# Patient Record
Sex: MALE | Race: BLACK OR AFRICAN AMERICAN | NOT HISPANIC OR LATINO | Employment: OTHER | URBAN - METROPOLITAN AREA
[De-identification: names, ages, dates, MRNs, and addresses within clinical notes are randomized per-mention and may not be internally consistent; named-entity substitution may affect disease eponyms.]

---

## 2021-01-12 ENCOUNTER — TREATMENT (OUTPATIENT)
Dept: PHYSICAL THERAPY | Facility: CLINIC | Age: 71
End: 2021-01-12

## 2021-01-12 ENCOUNTER — TRANSCRIBE ORDERS (OUTPATIENT)
Dept: PHYSICAL THERAPY | Facility: CLINIC | Age: 71
End: 2021-01-12

## 2021-01-12 DIAGNOSIS — M54.50 LOW BACK PAIN, UNSPECIFIED BACK PAIN LATERALITY, UNSPECIFIED CHRONICITY, UNSPECIFIED WHETHER SCIATICA PRESENT: Primary | ICD-10-CM

## 2021-01-12 DIAGNOSIS — M54.40 ACUTE BILATERAL LOW BACK PAIN WITH SCIATICA, SCIATICA LATERALITY UNSPECIFIED: Primary | ICD-10-CM

## 2021-01-12 PROCEDURE — 97140 MANUAL THERAPY 1/> REGIONS: CPT | Performed by: PHYSICAL THERAPIST

## 2021-01-12 PROCEDURE — 97161 PT EVAL LOW COMPLEX 20 MIN: CPT | Performed by: PHYSICAL THERAPIST

## 2021-01-12 PROCEDURE — 97014 ELECTRIC STIMULATION THERAPY: CPT | Performed by: PHYSICAL THERAPIST

## 2021-01-12 NOTE — PROGRESS NOTES
"Physical Therapy Initial Evaluation and Plan of Care    Patient: Estuardo King   : 1950  Diagnosis/ICD-10 Code:  Acute bilateral low back pain with sciatica, sciatica laterality unspecified [M54.40]  Referring practitioner: STIVEN Hodges  Date of Initial Visit: 2021  Today's Date: 2021  Patient seen for 1 sessions           Subjective Questionnaire: Oswestry: 46/50      Subjective Evaluation    History of Present Illness  Mechanism of injury: Patient reports he experienced a sudden onset of L lower back pain when he was at work when doing the push cart. Onset was 22.  He has not been able to work since that time.  He was seen by [facility] today for assessment and subsequent PT referral.  He reports significant pain with diffculty walking due to the pain, loss of balance wo fall and notes that he \"got stuck on the toilet\" due to pain and required assistance to get up.    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 )  Progression: worsening    Social Support  Lives in: one-story house  Lives with: spouse    Hand dominance: right    Treatments  Previous treatment: medication (script received today. )  Patient Goals  Patient goals for therapy: decreased pain             Objective        Special Questions  Patient is experiencing headaches and nausea/motion sickness.       Postural Observations    Additional Postural Observation Details  Seated posture is guarded.  Movements and transitions from sit <> stand and sit <> supine completed in a slow and guarded posturing with voice and facial grimaces.     Palpation   Left   Hypertonic in the erector spinae.   Tenderness of the erector spinae, iliacus and quadratus lumborum. "     Right   Hypertonic in the erector spinae. Tenderness of the erector spinae and quadratus lumborum.     Neurological Testing     Sensation     Lumbar   Left   Intact: light touch, kinesthesia and proprioception  Diminished: pin prick    Right   Intact: light touch, kinesthesia and proprioception  Diminished: pin prick    Reflexes   Left   Patellar (L4): trace (1+)  Achilles (S1): absent (0)  Babinski sign: negative    Right   Patellar (L4): trace (1+)  Achilles (S1): absent (0)  Babinski sign: negative    Active Range of Motion     Additional Active Range of Motion Details  Trunk AROM: severely limited in all planes with pain equal in flexion and extension.    LSB and bilateral rotation with increased L lumbar pain.     Passive Range of Motion     Additional Passive Range of Motion Details  PIVMs:  Dimished, however, unable to get a true value due to the muscle guarding of patient.     Strength/Myotome Testing     Additional Strength Details  Hip: cabrera flex, abd, and knee ext 4/5 with greater pain  Knee flex ankle: cabrera 5/5 wo pain  Patient unable to lay prone in order to test hip extension.     Tests       Thoracic   Positive slump.     Lumbar     Left   Positive crossed SLR and passive SLR.     Right   Positive passive SLR.     Ambulation     Ambulation: Level Surfaces   Ambulation with assistive device: independent    Observational Gait   Gait: antalgic   Decreased walking speed, stride length, left swing time, right swing time, left step length and right step length.   Left foot contact pattern: foot flat  Right foot contact pattern: foot flat  Left arm swing: decreased  Right arm swing: decreased  Base of support: normal    Quality of Movement During Gait   Trunk  Forward lean.      General Comments     Lumbar Comments  Resting /100    HR = 115  No distress signs noted and OK for therapy as per [facility] provider.   Post treatment:  BP  162/99,   HR = 110         Assessment & Plan      Assessment  Impairments: abnormal or restricted ROM, activity intolerance, impaired physical strength, lacks appropriate home exercise program and pain with function  Assessment details: Estuardo King is a 70 y.o. yr old male referred to PT due to a recent onset of lumbar pain from 2021while at work.  He was seen in the clinic today for the initial evaluation.  The current presentation was limited due to patient pain level and the inability to change/attain positions and complete the tests.  He did present with consistent valid efforts to complete all tasks of him. Estuardo's gait was with use of a cane and was slow,antalagic; he reports no need for AD prior to the injury.   His resting BP and HR elevated;however, clearance to treat was provider by [facility] referring provider.  His vitals did not have a significant change during the session.   He would benefit from outpatient physical therapy  in order to achieve the stated goals and maximal functional capacity.  The functional JESICA score is 46/50.   Barriers to therapy: current mobility   Prognosis: good  Prognosis details: Limitations:  Current elevation in BP, HR ( pnt reports started new meds last week).   Functional Limitations: lifting, uncomfortable because of pain, sitting and standing  Goals  Plan Goals: ST visits     1)  Pain levels 3 - 7/10     2)  Increase trunk ROM by 30%     3)  Patient will tolerate standing up to 15 min intrevals wo greater pain     4)  Complete transitions sit <> stand and sit <> supine with normal speed and wo evidence of pain     5)  Gait into the clinic and around the house wo assistive device and with 60% normal speed. ( 5MWT to be completed)        LTG:  DC     1)  Patient will complete pain free trunk ROM in order to complete dressing, bathing and get in/out of care wo greater pain.      2)  Sleep will not be affected by lumbar pain     3)  Patient will be able to complete household tasks in standing wo pain       4)  Normal gait pattern     5)  Walk distance as prior functional status     6)  MMT 5/5 BLE in order to complete household tasks and return to work.     Plan  Therapy options: will be seen for skilled physical therapy services  Planned modality interventions: cryotherapy, high voltage pulsed current (pain management), traction, thermotherapy (hydrocollator packs) and ultrasound  Planned therapy interventions: abdominal trunk stabilization, body mechanics training, flexibility, home exercise program, manual therapy, neuromuscular re-education, soft tissue mobilization, spinal/joint mobilization, stretching, strengthening, therapeutic activities and postural training  Frequency: 3x week  Treatment plan discussed with: patient  Plan details: 3x/ wk x's 24 visits        Timed:         Manual Therapy:    10     mins  08314;     Therapeutic Exercise:         mins  24371;     Neuromuscular Maris:        mins  25199;    Therapeutic Activity:          mins  68054;     Gait Training:           mins  94698;     Ultrasound:          mins  30541;    Ionto                                   mins   21549  Self Care                       3     mins   41127  Canalith Repos         mins 60589      Un-Timed:  Electrical Stimulation:    15     mins  80135 (MC );  Dry Needling          mins self-pay  Traction          mins 37463  Low Eval     24     Mins  02280  Mod Eval          Mins  38799  High Eval                            Mins  50127  Re-Eval                               mins  24947        Timed Treatment:   13   mins   Total Treatment:     52   mins    PT SIGNATURE: Aleyda Cabrera PT   DATE TREATMENT INITIATED: 1/12/2021    Initial Certification  Certification Period: 4/12/2021  I certify that the therapy services are furnished while this patient is under my care.  The services outlined above are required by this patient, and will be reviewed every 90 days.     PHYSICIAN: Deven Pierce PA      DATE:     Please  sign and return via fax to [phone].. Thank you, [facility].

## 2021-01-14 ENCOUNTER — TREATMENT (OUTPATIENT)
Dept: PHYSICAL THERAPY | Facility: CLINIC | Age: 71
End: 2021-01-14

## 2021-01-14 DIAGNOSIS — M54.40 ACUTE BILATERAL LOW BACK PAIN WITH SCIATICA, SCIATICA LATERALITY UNSPECIFIED: Primary | ICD-10-CM

## 2021-01-14 PROCEDURE — 97140 MANUAL THERAPY 1/> REGIONS: CPT | Performed by: PHYSICAL THERAPIST

## 2021-01-14 PROCEDURE — 97014 ELECTRIC STIMULATION THERAPY: CPT | Performed by: PHYSICAL THERAPIST

## 2021-01-14 PROCEDURE — 97110 THERAPEUTIC EXERCISES: CPT | Performed by: PHYSICAL THERAPIST

## 2021-01-14 NOTE — PROGRESS NOTES
Physical Therapy Daily Progress Note    VISIT#: 2    Ciro King reports his pain hasn't been much better since his eval.  Current Pain Level: 9/10    Objective   Resting /100    HR = 103  See Exercise, Manual, and Modality Logs for complete treatment.     Patient Education: taught and performed how to do a log roll in and out of bed    Assessment/Plan  Patient requires a significant amount of increased time to complete his exercises due to the pain in his LB and groin. He experienced occasional 'catching' in his groin during the HS stretch on his opposite side. Difficulty with transitioning into different positions. Does not care for ice so that was left off during ESTIM.    Progress per Plan    Goals  Plan Goals: ST visits     1)  Pain levels 3 - 7/10     2)  Increase trunk ROM by 30%     3)  Patient will tolerate standing up to 15 min intrevals wo greater pain     4)  Complete transitions sit <> stand and sit <> supine with normal speed and wo evidence of pain     5)  Gait into the clinic and around the house wo assistive device and with 60% normal speed. ( 5MWT to be completed)        LTG:  DC     1)  Patient will complete pain free trunk ROM in order to complete dressing, bathing and get in/out of care wo greater pain.      2)  Sleep will not be affected by lumbar pain     3)  Patient will be able to complete household tasks in standing wo pain      4)  Normal gait pattern     5)  Walk distance as prior functional status     6)  MMT 5/5 BLE in order to complete household tasks and return to work.           Timed:         Manual Therapy:    10     mins  58676;     Therapeutic Exercise:    36     mins  05556;         Un-Timed:  Electrical Stimulation:    15     mins  71306 ( );      Timed Treatment:   46   mins   Total Treatment:     66   mins    Cynthia Guzman PTA    Physical Therapist Assistant

## 2021-01-15 ENCOUNTER — TREATMENT (OUTPATIENT)
Dept: PHYSICAL THERAPY | Facility: CLINIC | Age: 71
End: 2021-01-15

## 2021-01-15 DIAGNOSIS — M54.40 ACUTE BILATERAL LOW BACK PAIN WITH SCIATICA, SCIATICA LATERALITY UNSPECIFIED: Primary | ICD-10-CM

## 2021-01-15 PROCEDURE — 97140 MANUAL THERAPY 1/> REGIONS: CPT | Performed by: PHYSICAL THERAPIST

## 2021-01-15 PROCEDURE — 97014 ELECTRIC STIMULATION THERAPY: CPT | Performed by: PHYSICAL THERAPIST

## 2021-01-15 PROCEDURE — 97110 THERAPEUTIC EXERCISES: CPT | Performed by: PHYSICAL THERAPIST

## 2021-01-15 NOTE — PROGRESS NOTES
Physical Therapy Daily Progress Note    VISIT#: 3    Subjective   Estuardo King reports that he back pain is a little better.  Location mostly on the left lumbar to left hip       Objective   BP seated:  150/84  HR:  67  Gait into the clinic with a slight increased speed an less reliance on the cane.  He was able to transition from sit to stand with a more smooth pattern.  Sit > supine I, but slow.   See Exercise, Manual, and Modality Logs for complete treatment.     Patient Education: cont with the [facility], PPT and hip rotation ex at home 3 x/day  IFC w MHP in hooklying @ end of session.     Assessment/Plan  Estuardo presented to the clinic today with subjective reports of slightly less pain.  This was evident with his gait and during positional transitions.  Some LE ex were able to be added today wo increased pain.  Use of heat vs ice today and positioned in hooklying vs SL for the IFC.      ST visits     1)  Pain levels 3 - 7/10     2)  Increase trunk ROM by 30%     3)  Patient will tolerate standing up to 15 min intrevals wo greater pain     4)  Complete transitions sit <> stand and sit <> supine with normal speed and wo evidence of pain     5)  Gait into the clinic and around the house wo assistive device and with 60% normal speed. ( 5MWT to be completed)        LTG:  DC     1)  Patient will complete pain free trunk ROM in order to complete dressing, bathing and get in/out of care wo greater pain.      2)  Sleep will not be affected by lumbar pain     3)  Patient will be able to complete household tasks in standing wo pain      4)  Normal gait pattern     5)  Walk distance as prior functional status     6)  MMT 5/5 BLE in order to complete household tasks and return to work.   Progress per Plan of Care            Timed:         Manual Therapy:    13     mins  03392;     Therapeutic Exercise:    18     mins  90501;     Neuromuscular Maris:        mins  24786;    Therapeutic Activity:          mins  01080;      Gait Training:           mins  42166;     Ultrasound:          mins  97242;    Ionto                                   mins   53057  Self Care                            mins   22103  Canalith Repos                   mins  10992    Un-Timed:  Electrical Stimulation:    15     mins  02901 ( );  Dry Needling          mins self-pay  Traction          mins 09293  Low Eval          Mins  33368  Mod Eval          Mins  25889  High Eval                            Mins  58740  Re-Eval                               mins  77380    Timed Treatment:   31   mins   Total Treatment:     49   mins    Aleyda Cabrera PT    Physical Therapist

## 2021-01-19 ENCOUNTER — TREATMENT (OUTPATIENT)
Dept: PHYSICAL THERAPY | Facility: CLINIC | Age: 71
End: 2021-01-19

## 2021-01-19 DIAGNOSIS — M54.40 ACUTE BILATERAL LOW BACK PAIN WITH SCIATICA, SCIATICA LATERALITY UNSPECIFIED: Primary | ICD-10-CM

## 2021-01-19 PROCEDURE — 97110 THERAPEUTIC EXERCISES: CPT | Performed by: PHYSICAL THERAPIST

## 2021-01-19 PROCEDURE — 97014 ELECTRIC STIMULATION THERAPY: CPT | Performed by: PHYSICAL THERAPIST

## 2021-01-19 PROCEDURE — 97112 NEUROMUSCULAR REEDUCATION: CPT | Performed by: PHYSICAL THERAPIST

## 2021-01-19 PROCEDURE — 97140 MANUAL THERAPY 1/> REGIONS: CPT | Performed by: PHYSICAL THERAPIST

## 2021-01-19 NOTE — PROGRESS NOTES
"Physical Therapy Daily Progress Note    VISIT#: 4    Subjective   Estuardo King reports that he is having difficulty making it to the bathroom in time due to the pain when trying to get out of bed.  \"I ma need to move my bathroom closer\".   He also states that he does not have his BP meds since PCP changed the script several weeks ago.  States [facility].       Objective   BP restin/98     cabrera hip rotation in supine produced mild increased pain contralateral.  Movement from sit <> stand and sit <> supine slow with some voice c/os of pain.   Use of SPC into the clinic; no AD when walking short distance of 10 ft.  Gait is still slow.   See Exercise, Manual, and Modality Logs for complete treatment.     Patient Education:  Instructed to contact his PCP regarding the continued elevated BP and no meds.  [facility] provider also notified.     Assessment/Plan   Very slow progress with his motion, ability to change positions and subjective reports of pain.        6 visits     1)  Pain levels 3 - 7/10     2)  Increase trunk ROM by 30%     3)  Patient will tolerate standing up to 15 min intrevals wo greater pain     4)  Complete transitions sit <> stand and sit <> supine with normal speed and wo evidence of pain     5)  Gait into the clinic and around the house wo assistive device and with 60% normal speed. ( 5MWT to be completed)        LTG:  DC     1)  Patient will complete pain free trunk ROM in order to complete dressing, bathing and get in/out of care wo greater pain.      2)  Sleep will not be affected by lumbar pain     3)  Patient will be able to complete household tasks in standing wo pain      4)  Normal gait pattern     5)  Walk distance as prior functional status     6)  MMT 5/5 BLE in order to complete household tasks and return to work.     Progress strengthening /stabilization /functional activity and Other            Timed:         Manual Therapy:    13     mins  98679;   "   Therapeutic Exercise:    12     mins  10106;     Neuromuscular Maris:    14    mins  55642;    Therapeutic Activity:          mins  22864;     Gait Training:           mins  37159;     Ultrasound:          mins  05606;    Ionto                                   mins   24395  Self Care                       2     mins   31908  Canalith Repos                   mins  76662    Un-Timed:  Electrical Stimulation:  15       mins  06027 ( );  Dry Needling          mins self-pay  Traction          mins 95556  Low Eval          Mins  42460  Mod Eval          Mins  33528  High Eval                            Mins  75680  Re-Eval                               mins  73817    Timed Treatment:   39   mins   Total Treatment:     56   mins    Aleyda Cabrera, PT    Physical Therapist

## 2021-01-20 ENCOUNTER — TREATMENT (OUTPATIENT)
Dept: PHYSICAL THERAPY | Facility: CLINIC | Age: 71
End: 2021-01-20

## 2021-01-20 DIAGNOSIS — M54.40 ACUTE BILATERAL LOW BACK PAIN WITH SCIATICA, SCIATICA LATERALITY UNSPECIFIED: Primary | ICD-10-CM

## 2021-01-20 PROCEDURE — 97110 THERAPEUTIC EXERCISES: CPT | Performed by: PHYSICAL THERAPIST

## 2021-01-20 PROCEDURE — 97014 ELECTRIC STIMULATION THERAPY: CPT | Performed by: PHYSICAL THERAPIST

## 2021-01-20 PROCEDURE — 97140 MANUAL THERAPY 1/> REGIONS: CPT | Performed by: PHYSICAL THERAPIST

## 2021-01-20 PROCEDURE — 97112 NEUROMUSCULAR REEDUCATION: CPT | Performed by: PHYSICAL THERAPIST

## 2021-01-20 NOTE — PROGRESS NOTES
Physical Therapy Daily Progress Note    VISIT#: 5    Ciro King reports his BP meds came yesterday. Pain is currently 7-8/10. Pt feels PT is helping.     Objective /78    See Exercise, Manual, and Modality Logs for complete treatment.     Patient Education: cues for therex    Assessment/Plan Pt had difficulty moving around on smaller width table today. Pt tolerated progressions per flow sheet. Modalities were well tolerated at end of session and pt reported slightly reduced pain afterwards.     Progress per Plan of Care and Progress strengthening /stabilization /functional activity            Timed:         Manual Therapy:   11    mins  84285;     Therapeutic Exercise:    12     mins  05024;     Neuromuscular Maris:   12     mins  16694;    Therapeutic Activity:          mins  06022;     Gait Training:           mins  92333;     Ultrasound:         mins  10281;    Ionto                                   mins   92185  Self Care                            mins   02195  Canalith Repos                   mins  41451    Un-Timed:  Electrical Stimulation:    15   mins  10779 ( );  Dry Needling          mins self-pay  Traction          mins 76334  Low Eval          Mins  05503  Mod Eval          Mins  19625  High Eval                           Mins  26099  Re-Eval                               mins  08266    Timed Treatment:  35    mins   Total Treatment:     50   mins    Vianca De La Cruz, PT  [place] License # [id number]  Physical Therapist

## 2021-01-22 ENCOUNTER — TREATMENT (OUTPATIENT)
Dept: PHYSICAL THERAPY | Facility: CLINIC | Age: 71
End: 2021-01-22

## 2021-01-22 DIAGNOSIS — M54.40 ACUTE BILATERAL LOW BACK PAIN WITH SCIATICA, SCIATICA LATERALITY UNSPECIFIED: Primary | ICD-10-CM

## 2021-01-22 PROCEDURE — 97014 ELECTRIC STIMULATION THERAPY: CPT | Performed by: PHYSICAL THERAPIST

## 2021-01-22 PROCEDURE — 97530 THERAPEUTIC ACTIVITIES: CPT | Performed by: PHYSICAL THERAPIST

## 2021-01-22 PROCEDURE — 97110 THERAPEUTIC EXERCISES: CPT | Performed by: PHYSICAL THERAPIST

## 2021-01-22 NOTE — PROGRESS NOTES
Physical Therapy Daily Progress Note    VISIT#: 6    Subjective   Estuardo King reports he feels like he is starting to move a little easier since starting physical therapy.  Current Pain Level: 7/10    Objective     See Exercise, Manual, and Modality Logs for complete treatment.     Patient Education: cues for therex    Assessment/Plan  Patient still reports a high pain level in his LB however he is walking and transitioning with a little more ease. Reported decrease in pain with modalities at end of session.    Progress per Plan of Care    Goals  Plan Goals: ST visits     1)  Pain levels 3 - 7/10     2)  Increase trunk ROM by 30%     3)  Patient will tolerate standing up to 15 min intrevals wo greater pain     4)  Complete transitions sit <> stand and sit <> supine with normal speed and wo evidence of pain     5)  Gait into the clinic and around the house wo assistive device and with 60% normal speed. ( 5MWT to be completed)        LTG:  DC     1)  Patient will complete pain free trunk ROM in order to complete dressing, bathing and get in/out of care wo greater pain.      2)  Sleep will not be affected by lumbar pain     3)  Patient will be able to complete household tasks in standing wo pain      4)  Normal gait pattern     5)  Walk distance as prior functional status     6)  MMT 5/5 BLE in order to complete household tasks and return to work.           Timed:            Therapeutic Exercise:    29     mins  13235;       Therapeutic Activity:     12     mins  48762;         Un-Timed:  Electrical Stimulation:    15     mins  98116 ( );      Timed Treatment:   41   mins   Total Treatment:     56   mins    Cynthia Guzman PTA    Physical Therapist Assistant

## 2021-01-27 ENCOUNTER — TREATMENT (OUTPATIENT)
Dept: PHYSICAL THERAPY | Facility: CLINIC | Age: 71
End: 2021-01-27

## 2021-01-27 DIAGNOSIS — M54.40 ACUTE BILATERAL LOW BACK PAIN WITH SCIATICA, SCIATICA LATERALITY UNSPECIFIED: Primary | ICD-10-CM

## 2021-01-27 PROCEDURE — 97110 THERAPEUTIC EXERCISES: CPT | Performed by: PHYSICAL THERAPIST

## 2021-01-27 PROCEDURE — 97140 MANUAL THERAPY 1/> REGIONS: CPT | Performed by: PHYSICAL THERAPIST

## 2021-01-27 PROCEDURE — 97014 ELECTRIC STIMULATION THERAPY: CPT | Performed by: PHYSICAL THERAPIST

## 2021-01-27 PROCEDURE — 97112 NEUROMUSCULAR REEDUCATION: CPT | Performed by: PHYSICAL THERAPIST

## 2021-01-27 NOTE — PROGRESS NOTES
Physical Therapy Daily Progress Note    VISIT#: 7    Subjective   [facility] reports pain is more at the L LB today. Less hip pain.   Pain Rating (0-10): 6    Objective , SpO2 96%, /94 mid session    See Exercise, Manual, and Modality Logs for complete treatment.     Patient Education: cues for therex    Assessment/Plan  Pt tolerated session fairly well with mild reduction of pain prior to Estim. Modalities were well tolerated with further reduction in pain. Pt with some improved mobility with transitional movements in the clinic today.     Progress per Plan of Care and Progress strengthening /stabilization /functional activity            Timed:         Manual Therapy:    14     mins  88873;     Therapeutic Exercise:     10    mins  02121;     Neuromuscular Maris:   10     mins  50730;    Therapeutic Activity:          mins  21637;     Gait Training:           mins  80683;     Ultrasound:         mins  01979;    Ionto                                   mins   71305  Self Care                            mins   82486  Canalith Repos                   mins  56020    Un-Timed:  Electrical Stimulation:  10     mins  59729 ( );  Dry Needling          mins self-pay  Traction          mins 80128  Low Eval          Mins  49768  Mod Eval          Mins  62174  High Eval                           Mins  13275  Re-Eval                               mins  83433    Timed Treatment:  34   mins   Total Treatment:     44   mins    Vianca De La Cruz, PT  [place] License # [id number]  Physical Therapist

## 2021-01-29 ENCOUNTER — TREATMENT (OUTPATIENT)
Dept: PHYSICAL THERAPY | Facility: CLINIC | Age: 71
End: 2021-01-29

## 2021-01-29 DIAGNOSIS — M54.40 ACUTE BILATERAL LOW BACK PAIN WITH SCIATICA, SCIATICA LATERALITY UNSPECIFIED: Primary | ICD-10-CM

## 2021-01-29 PROCEDURE — 97110 THERAPEUTIC EXERCISES: CPT | Performed by: PHYSICAL THERAPIST

## 2021-01-29 PROCEDURE — 97112 NEUROMUSCULAR REEDUCATION: CPT | Performed by: PHYSICAL THERAPIST

## 2021-01-29 NOTE — PROGRESS NOTES
Physical Therapy Daily Progress Note    VISIT#: 8    Subjective   Estuardo King reports he's been getting better, but reports he's been stiffening up more today especially with transitional movements. Pt states he can stand ~30 mins before pain increases.     Pain Rating (0-10): 6 at present and at best, 7 at worst    Objective /96 then 164/110 after sitting up after ex then 166/112    See Exercise, Manual, and Modality Logs for complete treatment.     Patient Education: cues for therex    Assessment/Plan Pt with some increased L LBP with transitional movements in the clinic today. BP was elevated initially and worsened after ex. It did not go down with rest. Referring provider came over and discussed with PT & pt that it was okay to see him as long as no headache or other neuro/cardio symptoms, but to avoid strenuous activity. Deferred manual, standing activities and testing 5MWT due to elevated BP today. Ended with MH in S/L with some reduced pain.     Goals  Plan Goals: ST visits     1)  Pain levels 3 - 7/10 Partially Met     2)  Increase trunk ROM by 30% Not assessed     3)  Patient will tolerate standing up to 15 min intrevals wo greater pain Met     4)  Complete transitions sit <> stand and sit <> supine with normal speed and wo evidence of pain Not Met     5)  Gait into the clinic and around the house wo assistive device and with 60% normal speed. ( 5MWT to be completed) Not assessed due to elevated BP      Progress per Plan of Care and Progress strengthening /stabilization /functional activity            Timed:         Manual Therapy:         mins  92442;     Therapeutic Exercise:   20     mins  42218;     Neuromuscular Maris:  10      mins  96334;    Therapeutic Activity:          mins  30205;     Gait Training:           mins  59898;     Ultrasound:         mins  68616;    Ionto                                   mins   92999  Self Care                            mins   46869  [facility]                    mins  55426    Un-Timed:  Electrical Stimulation:         mins  94777 ( );  Dry Needling          mins self-pay  Traction          mins 50618  Low Eval          Mins  25043  Mod Eval          Mins  89508  High Eval                           Mins  04604  Re-Eval                               mins  37660     x10' N/C    Timed Treatment:  30    mins   Total Treatment:     40   mins    Vianca De La Cruz PT  [place] License # [id number]  Physical Therapist

## 2021-02-02 ENCOUNTER — TREATMENT (OUTPATIENT)
Dept: PHYSICAL THERAPY | Facility: CLINIC | Age: 71
End: 2021-02-02

## 2021-02-02 DIAGNOSIS — M54.40 ACUTE BILATERAL LOW BACK PAIN WITH SCIATICA, SCIATICA LATERALITY UNSPECIFIED: Primary | ICD-10-CM

## 2021-02-02 PROCEDURE — 97112 NEUROMUSCULAR REEDUCATION: CPT | Performed by: PHYSICAL THERAPIST

## 2021-02-02 PROCEDURE — 97110 THERAPEUTIC EXERCISES: CPT | Performed by: PHYSICAL THERAPIST

## 2021-02-02 PROCEDURE — 97140 MANUAL THERAPY 1/> REGIONS: CPT | Performed by: PHYSICAL THERAPIST

## 2021-02-02 PROCEDURE — 97530 THERAPEUTIC ACTIVITIES: CPT | Performed by: PHYSICAL THERAPIST

## 2021-02-02 NOTE — PROGRESS NOTES
Physical Therapy Daily Progress Note    VISIT#: 9    Subjective   Estuardo Hill reports 6/10 pain across the back. Pt notes he has fallen in his bathroom from water spilling out of the shower onto the floor in the past. Pt notes he woke up with his head between the toilet and the sink.     Objective Pre Rx: , SpO2 98%, resting /78, no signs of distress    5MWT: 18 s, 18 s, 17 s = 17.66 s (avg of 3 trials w/cane); 5 m/17.66 s = .28 m/s    See Exercise, Manual, and Modality Logs for complete treatment.     Patient Education: cues for therex    Assessment/Plan Pt with significantly reduced gait speed and still using SPC. Pt without any change in LBP during today's session. Pt declined modalities before going next door to see referring provider.     Progress per Plan of Care and Progress strengthening /stabilization /functional activity            Timed:         Manual Therapy:    8     mins  68160;     Therapeutic Exercise:    12     mins  73419;     Neuromuscular Maris:   8     mins  45301;    Therapeutic Activity:      12    mins  75867;     Gait Training:           mins  30328;     Ultrasound:         mins  47258;    Ionto                                   mins   16781  Self Care                            mins   39812  Canalith Repos                   mins  75388    Un-Timed:  Electrical Stimulation:         mins  89299 ( );  Dry Needling          mins self-pay  Traction          mins 45822  Low Eval          Mins  77520  Mod Eval          Mins  59942  High Eval                           Mins  76070  Re-Eval                               mins  74206    Timed Treatment:  40    mins   Total Treatment:     40   mins    Vianca De La Cruz, PT  [place] License # [id number]  Physical Therapist

## 2021-02-04 ENCOUNTER — TREATMENT (OUTPATIENT)
Dept: PHYSICAL THERAPY | Facility: CLINIC | Age: 71
End: 2021-02-04

## 2021-02-04 DIAGNOSIS — M54.40 ACUTE BILATERAL LOW BACK PAIN WITH SCIATICA, SCIATICA LATERALITY UNSPECIFIED: Primary | ICD-10-CM

## 2021-02-04 PROCEDURE — 97112 NEUROMUSCULAR REEDUCATION: CPT | Performed by: PHYSICAL THERAPIST

## 2021-02-04 PROCEDURE — 97530 THERAPEUTIC ACTIVITIES: CPT | Performed by: PHYSICAL THERAPIST

## 2021-02-04 PROCEDURE — 97140 MANUAL THERAPY 1/> REGIONS: CPT | Performed by: PHYSICAL THERAPIST

## 2021-02-04 NOTE — PROGRESS NOTES
Physical Therapy Daily Progress Note    VISIT#: 10    Subjective   Estuardo Hill reports increased pain L LB. Pt notes the R knee feels weak and sometimes it feels like it will give way. Pt unsure what manual and/or exs seem to help, but notes he was sore after last session with 'the pulling'.     Pain Rating (0-10): 7    Objective Observation: improved mobility rising and walking out of clinic after session today    See Exercise & Manual Logs for complete treatment.     Patient Education: cues for therex, posture, gait & safety    Assessment/Plan Pt's R knee gave way slightly while walking into the clinic and he was able to recover with the cane. Pt tolerated manual well with some reduced pain. Pt has difficulty with moving around on standard plinths. However, by end of session, pt was able to rise and walk with greater ease with some reduced pain. Adjusted manual per flow sheet, deferring manual traction. Mechanical tx is in POC, but this may be impractical as pt has so much difficulty maneuvering on smaller plinths. In addition, pt was not comfortable with SAD in last session and felt improved pain afterwards. Consider adding US for pain prn.      Progress per Plan of Care and Progress strengthening /stabilization /functional activity            Timed:         Manual Therapy:   16    mins  64796;     Therapeutic Exercise:    6     mins  53118;     Neuromuscular Maris:   8     mins  81422;    Therapeutic Activity:     8     mins  91739;     Gait Training:           mins  30771;     Ultrasound:         mins  17745;    Ionto                                   mins   20170  Self Care                            mins   29226  Canalith Repos                   mins  85664    Un-Timed:  Electrical Stimulation:         mins  22939 ( );  Dry Needling          mins self-pay  Traction          mins 58175  Low Eval          Mins  07562  Mod Eval          Mins  52966  High Eval                           Mins  80884  Re-Eval                                mins  00320    Timed Treatment:   38   mins   Total Treatment:      38  mins    Vianca De La Cruz, PT  [place] License # [id number]  Physical Therapist

## 2021-02-08 ENCOUNTER — TREATMENT (OUTPATIENT)
Dept: PHYSICAL THERAPY | Facility: CLINIC | Age: 71
End: 2021-02-08

## 2021-02-08 DIAGNOSIS — M54.40 ACUTE BILATERAL LOW BACK PAIN WITH SCIATICA, SCIATICA LATERALITY UNSPECIFIED: Primary | ICD-10-CM

## 2021-02-08 PROCEDURE — 97140 MANUAL THERAPY 1/> REGIONS: CPT | Performed by: PHYSICAL THERAPIST

## 2021-02-08 PROCEDURE — 97110 THERAPEUTIC EXERCISES: CPT | Performed by: PHYSICAL THERAPIST

## 2021-02-08 PROCEDURE — 97014 ELECTRIC STIMULATION THERAPY: CPT | Performed by: PHYSICAL THERAPIST

## 2021-02-08 PROCEDURE — 97530 THERAPEUTIC ACTIVITIES: CPT | Performed by: PHYSICAL THERAPIST

## 2021-02-08 NOTE — PROGRESS NOTES
"Physical Therapy Daily Progress Note    VISIT#: 11    Subjective   Estuardo King reports that his back pain is mostly 7/10; states it does go higher at times.   \"feels like my legs are concrete\"      Objective   Gait into the clinic with use of SPC.  Slow alex, no overly joshua signs of pain.  Transition from supine <> sit was I; but slow.   LAD wo greater LE pain and mild lumbar stretch.  Trunk ROM:  Flex = 31, ext 2, LSB = 8,  RSB = 10 B rotation  < 10. Pain in all planes; greater with ext, LSB and B rotation.    Ex focus shifted toward more mobility and strengthening.  See Exercise, Manual, and Modality Logs for complete treatment.     Patient Education: to continue with prior HEP.     Assessment/Plan  Mr. King presents to the clinic today with subjective pain level remaining elevated at 7/10.  His gait and movements are slow, wo facial grimancing or body guarding.  He has difficulty in lifting his legs onto and off the treatment table due to weakness vs pain during today's session.  The program today was with purpose of improving his strength and mobility.  He was unable to stand from a chair unless using his arms for assistance.   LAQ completion with 2 1/2#.  This was visit 11, thus progress note completed. Estuardo has demonstrated very slow progress toward the goals and he has not achieved any of the STGs.  The question is his pre-morbid functional mobility.  IFC was added back today for pain management. The current treatment plan is indicated to continue with the goal for increased mobility.     ST visits     1)  Pain levels 3 - 7/10  Progressing (eval 8-10/10)      2)  Increase trunk ROM by 30% progressing     3)  Patient will tolerate standing up to 15 min intrevals wo greater pain     4)  Complete transitions sit <> stand and sit <> supine with normal speed and wo evidence of pain progressing      5)  Gait into the clinic and around the house wo assistive device and with 60% normal speed. ( 5MWT to " be completed) progressing        LTG:  DC  Assess benefit of the IFC     1)  Patient will complete pain free trunk ROM in order to complete dressing, bathing and get in/out of care wo greater pain.      2)  Sleep will not be affected by lumbar pain     3)  Patient will be able to complete household tasks in standing wo pain      4)  Normal gait pattern     5)  Walk distance as prior functional status     6)  MMT 5/5 BLE in order to complete household tasks and return to work    Other to see [facility] provider post next visit.            Timed:         Manual Therapy:    14     mins  93407;     Therapeutic Exercise:    12     mins  24427;     Neuromuscular Maris:     mins  80686;    Therapeutic Activity:     16     mins  68223;     Gait Training:           mins  72383;     Ultrasound:          mins  09007;    Ionto                                   mins   29695  Self Care                            mins   32670  Canalith Repos                   mins  83811    Un-Timed:  Electrical Stimulation:    15     mins  29371 ( );  Dry Needling          mins self-pay  Traction          mins 37117  Low Eval          Mins  86403  Mod Eval          Mins  95159  High Eval                            Mins  02798  Re-Eval                               mins  70432    Timed Treatment:   42   mins   Total Treatment:     57   mins    Aleyda Cabrera PT    Physical Therapist

## 2021-02-09 ENCOUNTER — TREATMENT (OUTPATIENT)
Dept: PHYSICAL THERAPY | Facility: CLINIC | Age: 71
End: 2021-02-09

## 2021-02-09 PROCEDURE — 97032 APPL MODALITY 1+ESTIM EA 15: CPT | Performed by: PHYSICAL THERAPIST

## 2021-02-09 NOTE — PROGRESS NOTES
Physical Therapy Daily Progress Note    VISIT#: 12    Subjective   Estuardo King reports that his back remains at 7/10, mostly in in L Lumbar toward flank.       Objective   Gait into the clinic with use of SPC.  Slow alex, no overly joshua signs of pain.  Transition from supine <> sit was I; but slow.   LAD wo greater LE pain and mild lumbar stretch.  Trunk ROM:  Flex = 31, ext 2, LSB = 8,  RSB = 10 B rotation  < 10. Pain in all planes; greater with ext, LSB and B rotation. (assessed 2021)  OSWESTRY scored at 36/50 indicating 68% disability. (Initial Eval was 46/50, indicating 92% disability)    Ex focus shifted toward more mobility and strengthening.  See Exercise, Manual, and Modality Logs for complete treatment.     Patient Education: to continue with prior HEP.     Assessment/Plan  Mr. King presents to the clinic today with subjective pain level remaining elevated at 7/10.  His gait and movements are slow, with facial grimancing and mild body guarding. He continues to have difficulty lifting LE's onto and off of the treatment table and is unable to stand without the use of his UE's for assistance. Continued with there ex for the purpose of improving his strength and mobility. Did not progress this date as he was progressed yesterday. This was visit 12, progress note was completed at previous session, with carry over of information from last session to this as pt has MD f/u following.    Estuardo has demonstrated very slow progress toward the goals and he has not achieved any of the STGs.  The question is his pre-morbid functional mobility.  IFC was again today for pain management. The current treatment plan is indicated to continue with the goal for increased mobility.     ST visits     1)  Pain levels 3 - 7/10  Progressing (eval 8-10/10)      2)  Increase trunk ROM by 30% progressing     3)  Patient will tolerate standing up to 15 min intrevals wo greater pain     4)  Complete transitions sit <> stand  and sit <> supine with normal speed and wo evidence of pain progressing      5)  Gait into the clinic and around the house wo assistive device and with 60% normal speed. ( 5MWT to be completed) progressing        LTG:  DC  Assess benefit of the IFC     1)  Patient will complete pain free trunk ROM in order to complete dressing, bathing and get in/out of care wo greater pain.      2)  Sleep will not be affected by lumbar pain     3)  Patient will be able to complete household tasks in standing wo pain      4)  Normal gait pattern     5)  Walk distance as prior functional status     6)  MMT 5/5 BLE in order to complete household tasks and return to work       Timed:         Manual Therapy:         mins  88490;     Therapeutic Exercise:    30     mins  79288;     Neuromuscular Maris:      mins  76891;    Therapeutic Activity:     10     mins  59976;     Gait Training:           mins  68238;     Ultrasound:          mins  55613;    Ionto                                   mins   28937  Self Care                            mins   65250  Canalith Repos                   mins  18379    Un-Timed:  Electrical Stimulation:    15     mins  47516 ( );  Dry Needling          mins self-pay  Traction          mins 35868  Low Eval          Mins  43929  Mod Eval          Mins  88978  High Eval                            Mins  13289  Re-Eval                               mins  73072    Timed Treatment:   40   mins   Total Treatment:     55   mins    Abigail Sanchez PTA    Physical Therapist Assistant

## 2021-02-12 ENCOUNTER — TREATMENT (OUTPATIENT)
Dept: PHYSICAL THERAPY | Facility: CLINIC | Age: 71
End: 2021-02-12

## 2021-02-12 DIAGNOSIS — M54.40 ACUTE BILATERAL LOW BACK PAIN WITH SCIATICA, SCIATICA LATERALITY UNSPECIFIED: Primary | ICD-10-CM

## 2021-02-12 PROCEDURE — 97110 THERAPEUTIC EXERCISES: CPT | Performed by: PHYSICAL THERAPIST

## 2021-02-12 PROCEDURE — 97112 NEUROMUSCULAR REEDUCATION: CPT | Performed by: PHYSICAL THERAPIST

## 2021-02-12 PROCEDURE — 97140 MANUAL THERAPY 1/> REGIONS: CPT | Performed by: PHYSICAL THERAPIST

## 2021-02-12 PROCEDURE — 97530 THERAPEUTIC ACTIVITIES: CPT | Performed by: PHYSICAL THERAPIST

## 2021-02-12 PROCEDURE — 97014 ELECTRIC STIMULATION THERAPY: CPT | Performed by: PHYSICAL THERAPIST

## 2021-02-12 NOTE — PROGRESS NOTES
"Physical Therapy Daily Progress Note    VISIT#: 13    Subjective   Estuardo King reports that his back pain is better. 6/10.  [facility] with with orders to cont with PT and hopes of doing imaging.       Objective   Gait into the clinic with SPC, gait speed with a slight decrease in speed.   He was able to I complete sit > supine from a 21\" ht surface wo evidence of pain   Distraction produces less pain.    See Exercise, Manual, and Modality Logs for complete treatment.     Assessment/Plan   Today is visit 13, including the eval date.  Estuardo does continue to report LBP; however less intensity.  His gait is improved regarding speed and equal wt bearing.  His mobility and functional transitions was also completed with increased speed and no signs of distress.    ST visits     1)  Pain levels 3 - 7/10  Progressing (eval 8-10/10)      2)  Increase trunk ROM by 30% progressing     3)  Patient will tolerate standing up to 15 min intrevals wo greater pain     4)  Complete transitions sit <> stand and sit <> supine with normal speed and wo evidence of pain progressing      5)  Gait into the clinic and around the house wo assistive device and with 60% normal speed. ( 5MWT to be completed) progressing        LTG:  DC  Assess benefit of the IFC     1)  Patient will complete pain free trunk ROM in order to complete dressing, bathing and get in/out of care wo greater pain.      2)  Sleep will not be affected by lumbar pain     3)  Patient will be able to complete household tasks in standing wo pain      4)  Normal gait pattern     5)  Walk distance as prior functional status     6)  MMT 5/5 BLE in order to complete household tasks and return to work    Other  5meter walk test, 5XSTS test            Timed:         Manual Therapy:    11     mins  38633;     Therapeutic Exercise:    12     mins  64626;     Neuromuscular Maris:    13    mins  60625;    Therapeutic Activity:     11     mins  " 80498;     Gait Training:           mins  64676;     Ultrasound:          mins  45543;    Ionto                                   mins   56092  Self Care                            mins   73473  Canalith Repos                   mins  57438    Un-Timed:  Electrical Stimulation:    15     mins  16811 ( );  Dry Needling          mins self-pay  Traction          mins 23505  Low Eval          Mins  54105  Mod Eval          Mins  50081  High Eval                            Mins  45354  Re-Eval                               mins  49731    Timed Treatment:   47   mins   Total Treatment:     62   mins    Aleyda Cabrera PT    Physical Therapist

## 2021-02-15 ENCOUNTER — TREATMENT (OUTPATIENT)
Dept: PHYSICAL THERAPY | Facility: CLINIC | Age: 71
End: 2021-02-15

## 2021-02-15 DIAGNOSIS — M54.40 ACUTE BILATERAL LOW BACK PAIN WITH SCIATICA, SCIATICA LATERALITY UNSPECIFIED: Primary | ICD-10-CM

## 2021-02-15 PROCEDURE — 97140 MANUAL THERAPY 1/> REGIONS: CPT | Performed by: PHYSICAL THERAPIST

## 2021-02-15 PROCEDURE — 97014 ELECTRIC STIMULATION THERAPY: CPT | Performed by: PHYSICAL THERAPIST

## 2021-02-15 PROCEDURE — 97530 THERAPEUTIC ACTIVITIES: CPT | Performed by: PHYSICAL THERAPIST

## 2021-02-15 PROCEDURE — 97112 NEUROMUSCULAR REEDUCATION: CPT | Performed by: PHYSICAL THERAPIST

## 2021-02-15 NOTE — PROGRESS NOTES
Physical Therapy Daily Progress Note    VISIT#: 14    Ciro King reports he got his 1st vaccine injex yesterday. Pt notes no adverse affects other than the L shoulder being a little sore at the injex site. Pt is scheduled for the 2nd dose of the vaccine on 3/13. LBP has been more at the L T/L region.     Pain Rating (0-10): 7-8    Objective     See Exercise, Manual, and Modality Logs for complete treatment.     Patient Education: cues for therex, posture/abd draw & gait during session    Assessment/Plan Pt with slightly reduced speed and mod difficulty with transitional  movements today due to increased pain. Manual did relieve pain somewhat, but pt was slightly uncomfortable with L S/L during manual and NMR. Gait speed continues to be improved, but still reduced from normal. Pt was challenged with cone activities, but was able to complete with 2 instances of knocking over a cone.     Progress per Plan of Care and Progress strengthening /stabilization /functional activity            Timed:         Manual Therapy:   17      mins  89884;     Therapeutic Exercise:         mins  84981;     Neuromuscular Maris:  15      mins  51259;    Therapeutic Activity:    14      mins  37019;     Gait Training:           mins  42468;     Ultrasound:         mins  60277;    Ionto                                   mins   37085  Self Care                            mins   75404  Canalith Repos                   mins  16315    Un-Timed:  Electrical Stimulation:   10      mins  56103 ( );  Dry Needling          mins self-pay  Traction          mins 91942  Low Eval          Mins  49284  Mod Eval          Mins  02076  High Eval                           Mins  68777  Re-Eval                               mins  87998    Timed Treatment:  46    mins   Total Treatment:     56   mins    Vianca De La Cruz, PT  [place] License # [id number]  Physical Therapist

## 2021-02-17 ENCOUNTER — TREATMENT (OUTPATIENT)
Dept: PHYSICAL THERAPY | Facility: CLINIC | Age: 71
End: 2021-02-17

## 2021-02-17 DIAGNOSIS — M54.40 ACUTE BILATERAL LOW BACK PAIN WITH SCIATICA, SCIATICA LATERALITY UNSPECIFIED: Primary | ICD-10-CM

## 2021-02-17 PROCEDURE — 97110 THERAPEUTIC EXERCISES: CPT | Performed by: PHYSICAL THERAPIST

## 2021-02-17 PROCEDURE — 97140 MANUAL THERAPY 1/> REGIONS: CPT | Performed by: PHYSICAL THERAPIST

## 2021-02-17 NOTE — PROGRESS NOTES
Physical Therapy Daily Progress Note    VISIT#: 15    Ciro King reports he had to clean the snow off of his truck before he came in. His back pain flared up very quickly. He said he has had a HA ever since and felt his heart flutter while shoveling. It has stopped since arriving at the clinic.  Current Pain Level: 7/10    Objective     See Exercise, Manual, and Modality Logs for complete treatment.     Patient Education: cueing for therex    Assessment/Plan  Monitored patients vitals at the beginning of his session. Resting HR and BP was slightly elevated when compared to previous treatments. Rested and rechecked vitals. They decreased slightly and HA went away. Was able to perform manual but c/o increased left sided back pain while performing mobs on the L hip. Okay tolerance to supine exercises this date.    Plan: Goes to see PA after his appointment today. Going to follow back up with our office after his appointment.    GOALS  ST visits     1)  Pain levels 3 - 7/10  Progressing (eval 8-10/10)      2)  Increase trunk ROM by 30% progressing     3)  Patient will tolerate standing up to 15 min intrevals wo greater pain     4)  Complete transitions sit <> stand and sit <> supine with normal speed and wo evidence of pain progressing      5)  Gait into the clinic and around the house wo assistive device and with 60% normal speed. (5MWT to be completed) progressing        LTG:  DC  Assess benefit of the IFC     1)  Patient will complete pain free trunk ROM in order to complete dressing, bathing and get in/out of care wo greater pain.      2)  Sleep will not be affected by lumbar pain     3)  Patient will be able to complete household tasks in standing wo pain      4)  Normal gait pattern     5)  Walk distance as prior functional status     6)  MMT 5/5 BLE in order to complete household tasks and return to work     Other  5meter walk test, 5XSTS test            Timed:         Manual Therapy:    16      mins  82879;     Therapeutic Exercise:    31     mins  41503;       Un-Timed:      Timed Treatment:   47   mins   Total Treatment:     47   mins    Cynthia Guzman PTA    Physical Therapist Assistant

## 2022-12-27 ENCOUNTER — DOCUMENTATION (OUTPATIENT)
Dept: PHYSICAL THERAPY | Facility: CLINIC | Age: 72
End: 2022-12-27

## 2022-12-27 NOTE — PROGRESS NOTES
Discharge Summary  Discharge Summary from Physical Therapy Report      Dates  PT visit: 21-21  Number of Visits:  15    Discharge Status of Patient: pt was to see the referring clinician after his 15th visit then canceled the next visit due to being discharged.     GOALS  ST visits     1)  Pain levels 3 - 7/10  Progressing (eval 8-10/10)      2)  Increase trunk ROM by 30% progressing     3)  Patient will tolerate standing up to 15 min intrevals wo greater pain     4)  Complete transitions sit <> stand and sit <> supine with normal speed and wo evidence of pain progressing      5)  Gait into the clinic and around the house wo assistive device and with 60% normal speed. (5MWT to be completed) progressing        LTG:  DC  Assess benefit of the IFC     1)  Patient will complete pain free trunk ROM in order to complete dressing, bathing and get in/out of care wo greater pain.      2)  Sleep will not be affected by lumbar pain     3)  Patient will be able to complete household tasks in standing wo pain      4)  Normal gait pattern     5)  Walk distance as prior functional status     6)  MMT 5/5 BLE in order to complete household tasks and return to work    Discharge Plan: pt was discharged by referring    Comments: pt was given HEP during sessions    Date of Discharge: 22        Vianca De La Cruz, PT  Physical Therapist